# Patient Record
Sex: FEMALE | Race: WHITE | NOT HISPANIC OR LATINO | URBAN - NONMETROPOLITAN AREA
[De-identification: names, ages, dates, MRNs, and addresses within clinical notes are randomized per-mention and may not be internally consistent; named-entity substitution may affect disease eponyms.]

---

## 2021-06-25 ENCOUNTER — WEB ENCOUNTER (OUTPATIENT)
Dept: URBAN - NONMETROPOLITAN AREA CLINIC 2 | Facility: CLINIC | Age: 51
End: 2021-06-25

## 2021-06-25 ENCOUNTER — OFFICE VISIT (OUTPATIENT)
Dept: URBAN - NONMETROPOLITAN AREA CLINIC 2 | Facility: CLINIC | Age: 51
End: 2021-06-25
Payer: COMMERCIAL

## 2021-06-25 ENCOUNTER — TELEPHONE ENCOUNTER (OUTPATIENT)
Dept: URBAN - METROPOLITAN AREA CLINIC 92 | Facility: CLINIC | Age: 51
End: 2021-06-25

## 2021-06-25 VITALS
SYSTOLIC BLOOD PRESSURE: 113 MMHG | DIASTOLIC BLOOD PRESSURE: 75 MMHG | HEART RATE: 96 BPM | WEIGHT: 135 LBS | HEIGHT: 65 IN | BODY MASS INDEX: 22.49 KG/M2 | TEMPERATURE: 97.5 F

## 2021-06-25 DIAGNOSIS — Z98.890 S/P LUMPECTOMY, LEFT BREAST: ICD-10-CM

## 2021-06-25 DIAGNOSIS — K64.9 HEMORRHOIDS, UNSPECIFIED HEMORRHOID TYPE: ICD-10-CM

## 2021-06-25 DIAGNOSIS — Z12.11 SCREENING FOR COLON CANCER: ICD-10-CM

## 2021-06-25 DIAGNOSIS — Z80.0 FAMILY HISTORY OF COLON CANCER: ICD-10-CM

## 2021-06-25 PROBLEM — 15111000119100: Status: ACTIVE | Noted: 2021-06-25

## 2021-06-25 PROBLEM — 312824007: Status: ACTIVE | Noted: 2021-06-25

## 2021-06-25 PROCEDURE — 99203 OFFICE O/P NEW LOW 30 MIN: CPT | Performed by: INTERNAL MEDICINE

## 2021-06-25 RX ORDER — ESTRADIOL 0.5 MG/1
TAKE ONE TABLET BY MOUTH ONE TIME DAILY TABLET ORAL
Qty: 90 | Refills: 1 | Status: ACTIVE | COMMUNITY

## 2021-06-25 RX ORDER — LEVOTHYROXINE, LIOTHYRONINE 19; 4.5 UG/1; UG/1
TAKE TWO TABLETS BY MOUTH EVERY OTHER DAY AND ALTERNATE BY TAKING ONE TABLET BY MOUTH ON DAYS IN BETWEEN TABLET ORAL
Qty: 135 | Refills: 0 | Status: ACTIVE | COMMUNITY

## 2021-06-25 RX ORDER — DEXTROAMPHETAMINE SULFATE, DEXTROAMPHETAMINE SACCHARATE, AMPHETAMINE SULFATE AND AMPHETAMINE ASPARTATE 5; 5; 5; 5 MG/1; MG/1; MG/1; MG/1
CAPSULE, EXTENDED RELEASE ORAL
Qty: 30 | Status: ACTIVE | COMMUNITY

## 2021-06-25 RX ORDER — VILAZODONE HYDROCHLORIDE 20 MG/1
TAKE ONE TABLET BY MOUTH TWICE A DAY TABLET ORAL
Qty: 60 | Refills: 0 | Status: ACTIVE | COMMUNITY

## 2021-06-25 RX ORDER — SODIUM PICOSULFATE, MAGNESIUM OXIDE, AND ANHYDROUS CITRIC ACID 10; 3.5; 12 MG/160ML; G/160ML; G/160ML
160 ML LIQUID ORAL
Qty: 320 MILLILITER | Refills: 0 | OUTPATIENT
Start: 2021-06-25 | End: 2021-06-26

## 2021-06-25 NOTE — HPI-TODAY'S VISIT:
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  bms are qd-bid.  has int rectal blding which she feels is 2/2 her hemorrhoids.

## 2021-06-29 PROBLEM — 305058001: Status: ACTIVE | Noted: 2021-06-25

## 2021-08-27 ENCOUNTER — OFFICE VISIT (OUTPATIENT)
Dept: URBAN - NONMETROPOLITAN AREA SURGERY CENTER 1 | Facility: SURGERY CENTER | Age: 51
End: 2021-08-27
Payer: COMMERCIAL

## 2021-08-27 PROCEDURE — G8907 PT DOC NO EVENTS ON DISCHARG: HCPCS | Performed by: INTERNAL MEDICINE

## 2021-08-27 PROCEDURE — 45378 DIAGNOSTIC COLONOSCOPY: CPT | Performed by: INTERNAL MEDICINE

## 2021-08-27 RX ORDER — LEVOTHYROXINE, LIOTHYRONINE 19; 4.5 UG/1; UG/1
TAKE TWO TABLETS BY MOUTH EVERY OTHER DAY AND ALTERNATE BY TAKING ONE TABLET BY MOUTH ON DAYS IN BETWEEN TABLET ORAL
Qty: 135 | Refills: 0 | Status: ACTIVE | COMMUNITY

## 2021-08-27 RX ORDER — VILAZODONE HYDROCHLORIDE 20 MG/1
TAKE ONE TABLET BY MOUTH TWICE A DAY TABLET ORAL
Qty: 60 | Refills: 0 | Status: ACTIVE | COMMUNITY

## 2021-08-27 RX ORDER — DEXTROAMPHETAMINE SULFATE, DEXTROAMPHETAMINE SACCHARATE, AMPHETAMINE SULFATE AND AMPHETAMINE ASPARTATE 5; 5; 5; 5 MG/1; MG/1; MG/1; MG/1
CAPSULE, EXTENDED RELEASE ORAL
Qty: 30 | Status: ACTIVE | COMMUNITY

## 2021-08-27 RX ORDER — ESTRADIOL 0.5 MG/1
TAKE ONE TABLET BY MOUTH ONE TIME DAILY TABLET ORAL
Qty: 90 | Refills: 1 | Status: ACTIVE | COMMUNITY

## 2023-03-30 ENCOUNTER — OUT OF OFFICE VISIT (OUTPATIENT)
Dept: URBAN - METROPOLITAN AREA MEDICAL CENTER 1 | Facility: MEDICAL CENTER | Age: 53
End: 2023-03-30
Payer: COMMERCIAL

## 2023-03-30 DIAGNOSIS — R19.7 ACUTE DIARRHEA: ICD-10-CM

## 2023-03-30 DIAGNOSIS — R93.3 ABN FINDINGS-GI TRACT: ICD-10-CM

## 2023-03-30 DIAGNOSIS — R11.2 ACUTE NAUSEA WITH NONBILIOUS VOMITING: ICD-10-CM

## 2023-03-30 DIAGNOSIS — R10.84 ABDOMINAL CRAMPING, GENERALIZED: ICD-10-CM

## 2023-03-30 PROCEDURE — 99204 OFFICE O/P NEW MOD 45 MIN: CPT | Performed by: INTERNAL MEDICINE

## 2023-03-31 ENCOUNTER — OUT OF OFFICE VISIT (OUTPATIENT)
Dept: URBAN - METROPOLITAN AREA MEDICAL CENTER 1 | Facility: MEDICAL CENTER | Age: 53
End: 2023-03-31
Payer: COMMERCIAL

## 2023-03-31 DIAGNOSIS — R93.3 ABN FINDINGS-GI TRACT: ICD-10-CM

## 2023-03-31 DIAGNOSIS — R19.7 ACUTE DIARRHEA: ICD-10-CM

## 2023-03-31 DIAGNOSIS — R11.2 ACUTE NAUSEA WITH NONBILIOUS VOMITING: ICD-10-CM

## 2023-03-31 DIAGNOSIS — R10.84 ABDOMINAL CRAMPING, GENERALIZED: ICD-10-CM

## 2023-03-31 PROCEDURE — 99214 OFFICE O/P EST MOD 30 MIN: CPT | Performed by: INTERNAL MEDICINE

## 2023-09-29 ENCOUNTER — LAB OUTSIDE AN ENCOUNTER (OUTPATIENT)
Dept: URBAN - NONMETROPOLITAN AREA CLINIC 2 | Facility: CLINIC | Age: 53
End: 2023-09-29

## 2023-09-29 ENCOUNTER — OFFICE VISIT (OUTPATIENT)
Dept: URBAN - NONMETROPOLITAN AREA CLINIC 2 | Facility: CLINIC | Age: 53
End: 2023-09-29
Payer: COMMERCIAL

## 2023-09-29 VITALS
BODY MASS INDEX: 22.49 KG/M2 | DIASTOLIC BLOOD PRESSURE: 64 MMHG | HEART RATE: 70 BPM | WEIGHT: 135 LBS | SYSTOLIC BLOOD PRESSURE: 96 MMHG | TEMPERATURE: 98.1 F | HEIGHT: 65 IN

## 2023-09-29 DIAGNOSIS — Z12.11 SCREENING FOR COLON CANCER: ICD-10-CM

## 2023-09-29 DIAGNOSIS — K58.0 IRRITABLE BOWEL SYNDROME WITH DIARRHEA: ICD-10-CM

## 2023-09-29 DIAGNOSIS — Z98.890 S/P LUMPECTOMY, LEFT BREAST: ICD-10-CM

## 2023-09-29 DIAGNOSIS — K64.9 HEMORRHOIDS, UNSPECIFIED HEMORRHOID TYPE: ICD-10-CM

## 2023-09-29 DIAGNOSIS — K86.1 IDIOPATHIC CHRONIC PANCREATITIS: ICD-10-CM

## 2023-09-29 DIAGNOSIS — Z80.0 FAMILY HISTORY OF COLON CANCER: ICD-10-CM

## 2023-09-29 PROBLEM — 70153002: Status: ACTIVE | Noted: 2021-06-25

## 2023-09-29 PROCEDURE — 99204 OFFICE O/P NEW MOD 45 MIN: CPT | Performed by: NURSE PRACTITIONER

## 2023-09-29 RX ORDER — DICYCLOMINE HYDROCHLORIDE 20 MG/1
1 TABLET TABLET ORAL THREE TIMES A DAY
Qty: 90 | Refills: 3 | OUTPATIENT
Start: 2023-09-29 | End: 2024-01-26

## 2023-09-29 NOTE — HPI-TODAY'S VISIT:
Ms. Jaida Webb is a 53-year-old female who presents today for recurrent pancreatitis.  She was first diagnosed with pancreatitis at the end of April.  At that time she felt acutely ill, with fever, chills, bloating, and developed vomiting and diarrhea.  She was hospitalized for 5 days.  In September she began to develop the same discomfort but without the systemic symptoms.  She presented to the emergency room where she was again diagnosed with pancreatitis.  Of note, Radha drinks alcohol rarely and only socially.  She reports she had a total of 4-5 alcoholic beverages between April and most recent hospitalization.  An ultrasound was reportedly performed in April showing a normal gallbladder, but HIDA scan has not been performed.  Today she reports feeling fine.  She does not experience any abdominal pain.  She does endorse fairly consistent diarrhea though she was diagnosed with IBS-D as a teenager.  She denies any blood in her stool and her stools are brown in color.  We discussed starting dicyclomine and Metamucil for better control of diarrhea.  I also advised obtaining an MRI to further evaluate the pancreas in the setting of recurrent pancreatitis without alcohol use.  Patient agreed to plan.  LG.

## 2023-09-30 ENCOUNTER — WEB ENCOUNTER (OUTPATIENT)
Dept: URBAN - NONMETROPOLITAN AREA CLINIC 2 | Facility: CLINIC | Age: 53
End: 2023-09-30

## 2023-09-30 LAB
A/G RATIO: 1.9
ALBUMIN: 4.5
ALKALINE PHOSPHATASE: 68
ALT (SGPT): 6
AMYLASE: 100
AMYLASE: 100
AST (SGOT): 12
BILIRUBIN, TOTAL: 0.4
BUN/CREATININE RATIO: 17
BUN: 15
CALCIUM: 9.7
CARBON DIOXIDE, TOTAL: 25
CHLORIDE: 103
CREATININE: 0.87
EGFR: 80
GLOBULIN, TOTAL: 2.4
GLUCOSE: 81
LIPASE: 30
POTASSIUM: 5.1
PROTEIN, TOTAL: 6.9
SODIUM: 141

## 2023-10-03 ENCOUNTER — WEB ENCOUNTER (OUTPATIENT)
Dept: URBAN - NONMETROPOLITAN AREA CLINIC 2 | Facility: CLINIC | Age: 53
End: 2023-10-03

## 2023-10-11 ENCOUNTER — WEB ENCOUNTER (OUTPATIENT)
Dept: URBAN - NONMETROPOLITAN AREA CLINIC 2 | Facility: CLINIC | Age: 53
End: 2023-10-11

## 2023-10-16 ENCOUNTER — WEB ENCOUNTER (OUTPATIENT)
Dept: URBAN - NONMETROPOLITAN AREA CLINIC 2 | Facility: CLINIC | Age: 53
End: 2023-10-16

## 2023-10-25 ENCOUNTER — WEB ENCOUNTER (OUTPATIENT)
Dept: URBAN - NONMETROPOLITAN AREA CLINIC 2 | Facility: CLINIC | Age: 53
End: 2023-10-25

## 2023-11-01 ENCOUNTER — LAB OUTSIDE AN ENCOUNTER (OUTPATIENT)
Dept: URBAN - NONMETROPOLITAN AREA CLINIC 2 | Facility: CLINIC | Age: 53
End: 2023-11-01

## 2023-11-01 ENCOUNTER — WEB ENCOUNTER (OUTPATIENT)
Dept: URBAN - NONMETROPOLITAN AREA CLINIC 2 | Facility: CLINIC | Age: 53
End: 2023-11-01

## 2023-11-06 ENCOUNTER — TELEPHONE ENCOUNTER (OUTPATIENT)
Dept: URBAN - METROPOLITAN AREA CLINIC 96 | Facility: CLINIC | Age: 53
End: 2023-11-06

## 2023-11-06 ENCOUNTER — LAB OUTSIDE AN ENCOUNTER (OUTPATIENT)
Dept: URBAN - METROPOLITAN AREA CLINIC 96 | Facility: CLINIC | Age: 53
End: 2023-11-06

## 2023-11-06 PROBLEM — 129679001: Status: ACTIVE | Noted: 2023-11-06

## 2023-11-06 PROBLEM — 300331000: Status: ACTIVE | Noted: 2023-11-06

## 2023-11-13 ENCOUNTER — TELEPHONE ENCOUNTER (OUTPATIENT)
Dept: URBAN - NONMETROPOLITAN AREA CLINIC 2 | Facility: CLINIC | Age: 53
End: 2023-11-13

## 2023-11-16 ENCOUNTER — CLAIMS CREATED FROM THE CLAIM WINDOW (OUTPATIENT)
Dept: URBAN - NONMETROPOLITAN AREA SURGERY CENTER 1 | Facility: SURGERY CENTER | Age: 53
End: 2023-11-16
Payer: COMMERCIAL

## 2023-11-16 ENCOUNTER — OFFICE VISIT (OUTPATIENT)
Dept: URBAN - NONMETROPOLITAN AREA SURGERY CENTER 1 | Facility: SURGERY CENTER | Age: 53
End: 2023-11-16

## 2023-11-16 DIAGNOSIS — R10.13 ABDOMINAL DISCOMFORT, EPIGASTRIC: ICD-10-CM

## 2023-11-16 DIAGNOSIS — K29.60 ADENOPAPILLOMATOSIS GASTRICA: ICD-10-CM

## 2023-11-16 DIAGNOSIS — Z12.11 COLON CANCER SCREENING: ICD-10-CM

## 2023-11-16 DIAGNOSIS — R19.4 ALTERATION IN BOWEL ELIMINATION: ICD-10-CM

## 2023-11-16 DIAGNOSIS — K29.60 OTHER GASTRITIS WITHOUT BLEEDING: ICD-10-CM

## 2023-11-16 DIAGNOSIS — K22.89 DILATATION OF ESOPHAGUS: ICD-10-CM

## 2023-11-16 DIAGNOSIS — K22.89 OTHER SPECIFIED DISEASE OF ESOPHAGUS: ICD-10-CM

## 2023-11-16 DIAGNOSIS — Z12.11 ENCOUNTER FOR COLORECTAL CANCER SCREENING: ICD-10-CM

## 2023-11-16 PROCEDURE — 00813 ANES UPR LWR GI NDSC PX: CPT | Performed by: NURSE ANESTHETIST, CERTIFIED REGISTERED

## 2023-11-16 PROCEDURE — 43239 EGD BIOPSY SINGLE/MULTIPLE: CPT | Performed by: INTERNAL MEDICINE

## 2023-11-16 PROCEDURE — G8907 PT DOC NO EVENTS ON DISCHARG: HCPCS | Performed by: INTERNAL MEDICINE

## 2023-11-16 PROCEDURE — 45380 COLONOSCOPY AND BIOPSY: CPT | Performed by: INTERNAL MEDICINE

## 2023-11-16 RX ORDER — DICYCLOMINE HYDROCHLORIDE 20 MG/1
1 TABLET TABLET ORAL THREE TIMES A DAY
Qty: 90 | Refills: 3 | Status: ACTIVE | COMMUNITY
Start: 2023-09-29 | End: 2024-01-26

## 2023-11-30 ENCOUNTER — TELEPHONE ENCOUNTER (OUTPATIENT)
Dept: URBAN - NONMETROPOLITAN AREA CLINIC 2 | Facility: CLINIC | Age: 53
End: 2023-11-30

## 2023-12-05 ENCOUNTER — WEB ENCOUNTER (OUTPATIENT)
Dept: URBAN - NONMETROPOLITAN AREA CLINIC 2 | Facility: CLINIC | Age: 53
End: 2023-12-05

## 2023-12-13 ENCOUNTER — TELEPHONE ENCOUNTER (OUTPATIENT)
Dept: URBAN - METROPOLITAN AREA CLINIC 96 | Facility: CLINIC | Age: 53
End: 2023-12-13

## 2023-12-13 RX ORDER — FAMOTIDINE 40 MG/1
1 TABLET TABLET, FILM COATED ORAL TWICE A DAY
Qty: 180 TABLET | Refills: 1 | OUTPATIENT
Start: 2023-12-13

## 2023-12-20 ENCOUNTER — WEB ENCOUNTER (OUTPATIENT)
Dept: URBAN - NONMETROPOLITAN AREA CLINIC 2 | Facility: CLINIC | Age: 53
End: 2023-12-20

## 2024-01-12 ENCOUNTER — OFFICE VISIT (OUTPATIENT)
Dept: URBAN - NONMETROPOLITAN AREA CLINIC 2 | Facility: CLINIC | Age: 54
End: 2024-01-12

## 2024-01-24 ENCOUNTER — OFFICE VISIT (OUTPATIENT)
Dept: URBAN - NONMETROPOLITAN AREA CLINIC 2 | Facility: CLINIC | Age: 54
End: 2024-01-24
Payer: COMMERCIAL

## 2024-01-24 VITALS
BODY MASS INDEX: 24.16 KG/M2 | DIASTOLIC BLOOD PRESSURE: 72 MMHG | HEART RATE: 61 BPM | SYSTOLIC BLOOD PRESSURE: 109 MMHG | HEIGHT: 65 IN | WEIGHT: 145 LBS

## 2024-01-24 DIAGNOSIS — Z12.11 SCREENING FOR COLON CANCER: ICD-10-CM

## 2024-01-24 DIAGNOSIS — K86.1 IDIOPATHIC CHRONIC PANCREATITIS: ICD-10-CM

## 2024-01-24 DIAGNOSIS — K64.9 HEMORRHOIDS, UNSPECIFIED HEMORRHOID TYPE: ICD-10-CM

## 2024-01-24 DIAGNOSIS — Z98.890 S/P LUMPECTOMY, LEFT BREAST: ICD-10-CM

## 2024-01-24 DIAGNOSIS — K29.50 CHRONIC GASTRITIS WITHOUT BLEEDING, UNSPECIFIED GASTRITIS TYPE: ICD-10-CM

## 2024-01-24 DIAGNOSIS — K58.0 IRRITABLE BOWEL SYNDROME WITH DIARRHEA: ICD-10-CM

## 2024-01-24 DIAGNOSIS — Z80.0 FAMILY HISTORY OF COLON CANCER: ICD-10-CM

## 2024-01-24 PROBLEM — 8493009: Status: ACTIVE | Noted: 2024-01-24

## 2024-01-24 PROBLEM — 235953007: Status: ACTIVE | Noted: 2023-09-29

## 2024-01-24 PROBLEM — 197125005: Status: ACTIVE | Noted: 2023-09-29

## 2024-01-24 PROCEDURE — 99214 OFFICE O/P EST MOD 30 MIN: CPT | Performed by: NURSE PRACTITIONER

## 2024-01-24 RX ORDER — OMEPRAZOLE 40 MG/1
1 CAPSULE 30 MINUTES BEFORE MORNING MEAL CAPSULE, DELAYED RELEASE ORAL ONCE A DAY
Qty: 30 | Refills: 5 | OUTPATIENT
Start: 2024-01-24

## 2024-01-24 RX ORDER — FLUDROCORTISONE ACETATE 0.1 MG/1
TAKE ONE TABLET BY MOUTH ONE TIME DAILY TABLET ORAL
Qty: 90 UNSPECIFIED | Refills: 1 | Status: ACTIVE | COMMUNITY

## 2024-01-24 RX ORDER — COLESTIPOL HYDROCHLORIDE 1 G/1
1-2 TABLETS TABLET, FILM COATED ORAL ONCE A DAY
Qty: 60 TABLET | Refills: 3 | OUTPATIENT
Start: 2024-01-24

## 2024-01-24 RX ORDER — DICYCLOMINE HYDROCHLORIDE 20 MG/1
1 TABLET TABLET ORAL THREE TIMES A DAY
Qty: 90 | Refills: 3 | Status: ACTIVE | COMMUNITY
Start: 2023-09-29 | End: 2024-01-26

## 2024-01-24 RX ORDER — OMALIZUMAB 150 MG/ML
AS DIRECTED INJECTION, SOLUTION SUBCUTANEOUS
Status: ACTIVE | COMMUNITY

## 2024-01-24 RX ORDER — DEXTROAMPHETAMINE SACCHARATE, AMPHETAMINE ASPARTATE MONOHYDRATE, DEXTROAMPHETAMINE SULFATE, AND AMPHETAMINE SULFATE 5; 5; 5; 5 MG/1; MG/1; MG/1; MG/1
TAKE ONE CAPSULE BY MOUTH EVERY MORNING CAPSULE, EXTENDED RELEASE ORAL
Qty: 30 UNSPECIFIED | Refills: 0 | Status: ACTIVE | COMMUNITY

## 2024-01-24 RX ORDER — FAMOTIDINE 40 MG/1
1 TABLET TABLET, FILM COATED ORAL TWICE A DAY
Qty: 180 TABLET | Refills: 1 | Status: ACTIVE | COMMUNITY
Start: 2023-12-13

## 2024-01-24 RX ORDER — ESTRADIOL 0.5 MG/1
TAKE ONE TABLET BY MOUTH ONE TIME DAILY TABLET ORAL
Qty: 90 UNSPECIFIED | Refills: 3 | Status: ACTIVE | COMMUNITY

## 2024-01-24 NOTE — HPI-TODAY'S VISIT:
Radha presents today for f.u. Since her last visit, CT was obtained for further evaluation of possible causes of pancreatitis, showing unremarkable pancreas, but also noting "wall thickening at level of the rectum, distal descending and rectosigmoid colon", as well as "mild distal esophageal wall thickening". Patient agreed to proceed with repeat colon and EGD. MRCP was then obtained showing "normal pancreas".  Radha reported her stools often being green or yellow.  She also reported preregular bouts of "mild stomach pain ". Colonoscopy and EGD were performed by Dr. Hernandez on 11/16.  Colonoscopy revealed nonthrombosed external hemorrhoids with the remainder of the exam normal.  Pathology was also normal.  Upper endoscopy showed gastritis with pathology confirming slight chronic gastritis with no evidence of H. pylori or Lynn's. She was started on famotidine. Today She reports doing okay. She is frustrated that we have not found an overt cause of past pancreatitis episodes. She continues to experience intermittent LUQ pain which makes her concerned that another attack is about to begin. She feels famotidine may be helping somewhat, but not significantly.  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  However, she occasionally experiences diarrhea with abd pain and nausea which limits her functionality.  Denies urgency or incontinence. Stopped metamucil because it didnt seem to be helping. Interested in obtaining a HIDA scan to further eval function of gallbladder and rule that out as cause for symptoms. LG.

## 2024-01-24 NOTE — HPI-OTHER HISTORIES
9/29/23: Ms. Jaida Webb is a 53-year-old female who presents today for recurrent pancreatitis. She was first diagnosed with pancreatitis at the end of April. At that time she felt acutely ill, with fever, chills, bloating, and developed vomiting and diarrhea. She was hospitalized for 5 days. In September she began to develop the same discomfort but without the systemic symptoms. She presented to the emergency room where she was again diagnosed with pancreatitis. Of note, Radha drinks alcohol rarely and only socially. She reports she had a total of 4-5 alcoholic beverages between April and most recent hospitalization. An ultrasound was reportedly performed in April showing a normal gallbladder, but HIDA scan has not been performed. Today she reports feeling fine. She does not experience any abdominal pain. She does endorse fairly consistent diarrhea though she was diagnosed with IBS-D as a teenager. She denies any blood in her stool and her stools are brown in color. We discussed starting dicyclomine and Metamucil for better control of diarrhea. I also advised obtaining an MRI to further evaluate the pancreas in the setting of recurrent pancreatitis without alcohol use. Patient agreed to plan. LG

## 2024-02-07 ENCOUNTER — LAB (OUTPATIENT)
Dept: URBAN - NONMETROPOLITAN AREA CLINIC 2 | Facility: CLINIC | Age: 54
End: 2024-02-07

## 2024-04-22 ENCOUNTER — OV EP (OUTPATIENT)
Dept: URBAN - NONMETROPOLITAN AREA CLINIC 2 | Facility: CLINIC | Age: 54
End: 2024-04-22
Payer: COMMERCIAL

## 2024-04-22 VITALS
DIASTOLIC BLOOD PRESSURE: 73 MMHG | SYSTOLIC BLOOD PRESSURE: 112 MMHG | TEMPERATURE: 97.5 F | HEART RATE: 64 BPM | HEIGHT: 65 IN

## 2024-04-22 DIAGNOSIS — K58.0 IRRITABLE BOWEL SYNDROME WITH DIARRHEA: ICD-10-CM

## 2024-04-22 DIAGNOSIS — K29.50 CHRONIC GASTRITIS WITHOUT BLEEDING, UNSPECIFIED GASTRITIS TYPE: ICD-10-CM

## 2024-04-22 DIAGNOSIS — K64.9 HEMORRHOIDS: ICD-10-CM

## 2024-04-22 DIAGNOSIS — K86.1 IDIOPATHIC CHRONIC PANCREATITIS: ICD-10-CM

## 2024-04-22 PROBLEM — 428882003: Status: ACTIVE | Noted: 2024-04-22

## 2024-04-22 PROCEDURE — 99214 OFFICE O/P EST MOD 30 MIN: CPT | Performed by: NURSE PRACTITIONER

## 2024-04-22 RX ORDER — OMEPRAZOLE 40 MG/1
1 CAPSULE 30 MINUTES BEFORE MORNING MEAL CAPSULE, DELAYED RELEASE ORAL ONCE A DAY
Qty: 30 | Refills: 5 | Status: ACTIVE | COMMUNITY
Start: 2024-01-24

## 2024-04-22 RX ORDER — FLUDROCORTISONE ACETATE 0.1 MG/1
TAKE ONE TABLET BY MOUTH ONE TIME DAILY TABLET ORAL
Qty: 90 UNSPECIFIED | Refills: 1 | Status: ACTIVE | COMMUNITY

## 2024-04-22 RX ORDER — FAMOTIDINE 40 MG/1
1 TABLET TABLET, FILM COATED ORAL TWICE A DAY
Qty: 180 TABLET | Refills: 1 | Status: ACTIVE | COMMUNITY
Start: 2023-12-13

## 2024-04-22 RX ORDER — OMALIZUMAB 150 MG/ML
AS DIRECTED INJECTION, SOLUTION SUBCUTANEOUS
Status: ACTIVE | COMMUNITY

## 2024-04-22 RX ORDER — OMEPRAZOLE 40 MG/1
1 CAPSULE 30 MINUTES BEFORE MORNING MEAL CAPSULE, DELAYED RELEASE ORAL ONCE A DAY
Qty: 30 | Refills: 5 | OUTPATIENT

## 2024-04-22 RX ORDER — DEXTROAMPHETAMINE SACCHARATE, AMPHETAMINE ASPARTATE MONOHYDRATE, DEXTROAMPHETAMINE SULFATE, AND AMPHETAMINE SULFATE 5; 5; 5; 5 MG/1; MG/1; MG/1; MG/1
TAKE ONE CAPSULE BY MOUTH EVERY MORNING CAPSULE, EXTENDED RELEASE ORAL
Qty: 30 UNSPECIFIED | Refills: 0 | Status: ACTIVE | COMMUNITY

## 2024-04-22 RX ORDER — COLESTIPOL HYDROCHLORIDE 1 G/1
1-2 TABLETS TABLET, FILM COATED ORAL ONCE A DAY
Qty: 60 TABLET | Refills: 3 | Status: ACTIVE | COMMUNITY
Start: 2024-01-24

## 2024-04-22 RX ORDER — COLESTIPOL HYDROCHLORIDE 1 G/1
1-2 TABLETS TABLET, FILM COATED ORAL ONCE A DAY
Qty: 60 TABLET | Refills: 3 | OUTPATIENT

## 2024-04-22 RX ORDER — ESTRADIOL 0.5 MG/1
TAKE ONE TABLET BY MOUTH ONE TIME DAILY TABLET ORAL
Qty: 90 UNSPECIFIED | Refills: 3 | Status: ACTIVE | COMMUNITY

## 2024-04-22 NOTE — PHYSICAL EXAM GASTROINTESTINAL
Abdomen slightly distended but soft, laparotomy sites intact with surgical glue, no erythema, or drainage

## 2024-04-22 NOTE — HPI-TODAY'S VISIT:
Ms. Webb presents today for follow-up of IBS-D and left upper quadrant pain.  Since her last visit she underwent a HIDA scan showing hyperkinesis of the gallbladder.  This past Friday she underwent laparoscopic cholecystectomy with Dr. Salazar.  She feels she is recovering okay.  Sergey has bloating but has not needed to use any pain medication. Incisions intact without concern for infection. She has a follow-up dr. hinson on May 2.  Has not experienced diarrhea likely secondary to postoperative constipation.  She is not taking colestipol as she noted minimal improvement but she has it in case she experiences diarrhea following removal of her gallbladder.  No other GI questions or concerns today.  LG.

## 2024-04-22 NOTE — HPI-OTHER HISTORIES
9/29/23: Ms. Jaida Webb is a 53-year-old female who presents today for recurrent pancreatitis. She was first diagnosed with pancreatitis at the end of April. At that time she felt acutely ill, with fever, chills, bloating, and developed vomiting and diarrhea. She was hospitalized for 5 days. In September she began to develop the same discomfort but without the systemic symptoms. She presented to the emergency room where she was again diagnosed with pancreatitis. Of note, Radha drinks alcohol rarely and only socially. She reports she had a total of 4-5 alcoholic beverages between April and most recent hospitalization. An ultrasound was reportedly performed in April showing a normal gallbladder, but HIDA scan has not been performed. Today she reports feeling fine. She does not experience any abdominal pain. She does endorse fairly consistent diarrhea though she was diagnosed with IBS-D as a teenager. She denies any blood in her stool and her stools are brown in color. We discussed starting dicyclomine and Metamucil for better control of diarrhea. I also advised obtaining an MRI to further evaluate the pancreas in the setting of recurrent pancreatitis without alcohol use. Patient agreed to plan. LG  1/24/2024: Radha presents today for f.u. Since her last visit, CT was obtained for further evaluation of possible causes of pancreatitis, showing unremarkable pancreas, but also noting "wall thickening at level of the rectum, distal descending and rectosigmoid colon", as well as "mild distal esophageal wall thickening". Patient agreed to proceed with repeat colon and EGD. MRCP was then obtained showing "normal pancreas". Radha reported her stools often being green or yellow. She also reported preregular bouts of "mild stomach pain ". Colonoscopy and EGD were performed by Dr. Hernandez on 11/16. Colonoscopy revealed nonthrombosed external hemorrhoids with the remainder of the exam normal. Pathology was also normal. Upper endoscopy showed gastritis with pathology confirming slight chronic gastritis with no evidence of H. pylori or Lynn's. She was started on famotidine. Today She reports doing okay. She is frustrated that we have not found an overt cause of past pancreatitis episodes. She continues to experience intermittent LUQ pain which makes her concerned that another attack is about to begin. She feels famotidine may be helping somewhat, but not significantly. 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 However, she occasionally experiences diarrhea with abd pain and nausea which limits her functionality. Denies urgency or incontinence. Stopped metamucil because it didnt seem to be helping. Interested in obtaining a HIDA scan to further eval function of gallbladder and rule that out as cause for symptoms. LG.